# Patient Record
Sex: MALE | Race: WHITE | Employment: OTHER | URBAN - METROPOLITAN AREA
[De-identification: names, ages, dates, MRNs, and addresses within clinical notes are randomized per-mention and may not be internally consistent; named-entity substitution may affect disease eponyms.]

---

## 2017-01-05 PROBLEM — J01.00 ACUTE NON-RECURRENT MAXILLARY SINUSITIS: Status: ACTIVE | Noted: 2017-01-05

## 2017-01-19 PROCEDURE — 82306 VITAMIN D 25 HYDROXY: CPT | Performed by: SPECIALIST

## 2017-01-19 PROCEDURE — 80053 COMPREHEN METABOLIC PANEL: CPT | Performed by: SPECIALIST

## 2017-01-19 PROCEDURE — 36415 COLL VENOUS BLD VENIPUNCTURE: CPT | Performed by: SPECIALIST

## 2017-01-19 PROCEDURE — 84153 ASSAY OF PSA TOTAL: CPT | Performed by: SPECIALIST

## 2017-01-19 PROCEDURE — 85025 COMPLETE CBC W/AUTO DIFF WBC: CPT | Performed by: SPECIALIST

## 2017-01-19 PROCEDURE — 81003 URINALYSIS AUTO W/O SCOPE: CPT | Performed by: SPECIALIST

## 2017-01-19 PROCEDURE — 80061 LIPID PANEL: CPT | Performed by: SPECIALIST

## 2017-01-19 PROCEDURE — 84443 ASSAY THYROID STIM HORMONE: CPT | Performed by: SPECIALIST

## 2017-01-30 PROCEDURE — 36415 COLL VENOUS BLD VENIPUNCTURE: CPT | Performed by: SPECIALIST

## 2017-01-30 PROCEDURE — 84153 ASSAY OF PSA TOTAL: CPT | Performed by: SPECIALIST

## 2017-01-30 PROCEDURE — 84154 ASSAY OF PSA FREE: CPT | Performed by: SPECIALIST

## 2017-05-18 PROBLEM — E78.00 PURE HYPERCHOLESTEROLEMIA: Status: ACTIVE | Noted: 2017-05-18

## 2017-05-18 PROCEDURE — 84154 ASSAY OF PSA FREE: CPT | Performed by: SPECIALIST

## 2017-05-18 PROCEDURE — 36415 COLL VENOUS BLD VENIPUNCTURE: CPT | Performed by: SPECIALIST

## 2017-05-18 PROCEDURE — 84153 ASSAY OF PSA TOTAL: CPT | Performed by: SPECIALIST

## 2017-09-12 PROBLEM — M25.462 EFFUSION OF BURSA OF LEFT KNEE: Status: ACTIVE | Noted: 2017-09-12

## 2018-03-23 PROBLEM — R50.9 FEVER AND CHILLS: Status: ACTIVE | Noted: 2018-03-23

## 2018-03-23 PROBLEM — R05.9 COUGH: Status: ACTIVE | Noted: 2018-03-23

## 2018-09-13 PROBLEM — J01.01 ACUTE RECURRENT MAXILLARY SINUSITIS: Status: ACTIVE | Noted: 2017-01-05

## 2018-11-19 PROBLEM — R13.19 OTHER DYSPHAGIA: Status: ACTIVE | Noted: 2018-11-19

## 2023-11-27 ENCOUNTER — OFFICE VISIT (OUTPATIENT)
Dept: OTOLARYNGOLOGY | Facility: CLINIC | Age: 88
End: 2023-11-27

## 2023-11-27 DIAGNOSIS — J33.9 NASAL POLYPS: ICD-10-CM

## 2023-11-27 DIAGNOSIS — J32.0 LEFT MAXILLARY SINUSITIS: Primary | ICD-10-CM

## 2023-11-27 DIAGNOSIS — J32.2 CHRONIC ETHMOIDAL SINUSITIS: ICD-10-CM

## 2023-11-27 PROCEDURE — 99203 OFFICE O/P NEW LOW 30 MIN: CPT | Performed by: SPECIALIST

## 2023-11-27 RX ORDER — FLUTICASONE PROPIONATE 50 MCG
1 SPRAY, SUSPENSION (ML) NASAL DAILY
COMMUNITY

## 2023-11-27 RX ORDER — ATORVASTATIN CALCIUM 40 MG/1
40 TABLET, FILM COATED ORAL NIGHTLY
COMMUNITY
Start: 2023-02-27

## 2023-11-27 RX ORDER — SOTALOL HYDROCHLORIDE 80 MG/1
0.5 TABLET ORAL 2 TIMES DAILY
COMMUNITY
Start: 2023-07-31

## 2023-11-27 RX ORDER — VALSARTAN AND HYDROCHLOROTHIAZIDE 320; 25 MG/1; MG/1
1 TABLET, FILM COATED ORAL DAILY
COMMUNITY
Start: 2023-10-31

## 2023-11-27 NOTE — PATIENT INSTRUCTIONS
I sent a culture of  your sinusitis of your left maxillary and bilateral ethmoid sinuses. I will likely place you on an antibiotic based on culture results and 3 days of prednisone. Follow-up in 3 weeks time, sooner if problems.

## 2023-11-27 NOTE — PROGRESS NOTES
Marylee Boos is a 81year old male. Chief Complaint   Patient presents with    Sinus Problem     Patient reports sinus infection x 2 weeks   Reports sinus headaches. HPI:   Patient had been placed on 1 week of Augmentin had been hospitalized for sinusitis and headaches he has persistent symptoms. Current Outpatient Medications   Medication Sig Dispense Refill    atorvastatin 40 MG Oral Tab Take 1 tablet (40 mg total) by mouth nightly. fluticasone propionate 50 MCG/ACT Nasal Suspension 1 spray by Each Nare route daily. sotalol 80 MG Oral Tab Take 0.5 tablets (40 mg total) by mouth 2 (two) times daily. Valsartan-hydroCHLOROthiazide 320-25 MG Oral Tab Take 1 tablet by mouth daily. lisinopril 20 MG Oral Tab Take 1 tablet (20 mg total) by mouth once daily. 90 tablet 3    Pravastatin Sodium 20 MG Oral Tab TAKE ONE TABLET BY MOUTH AT BEDTIME 90 tablet 1    Albuterol Sulfate  (90 Base) MCG/ACT Inhalation Aero Soln Inhale 2 puffs into the lungs. methylPREDNISolone 4 MG Oral Tablet Therapy Pack Use as directed for 6 days 21 tablet 0    azithromycin (ZITHROMAX Z-TY) 250 MG Oral Tab Take two tablets today, then one tablet daily for 4 more days 6 tablet 0    guaiFENesin-codeine (CHERATUSSIN AC) 100-10 MG/5ML Oral Solution Take 5 mL by mouth every 6 (six) hours as needed for cough. 250 mL 1    OCUVITE (OCUVITE) Oral Tab Take 1 tablet by mouth daily with breakfast.      Vitamin B-12 (VITAMIN B12) 100 MCG Oral Tab Take 0.5 tablets (50 mcg total) by mouth daily. Ascorbic Acid (VITAMIN C) 1000 MG Oral Tab Take 1 tablet (1,000 mg total) by mouth as needed. Multiple Vitamin (TAB-A-KALYN) Oral Tab Take 1 tablet by mouth daily. aspirin 81 MG Oral Tab Take 1 tablet (81 mg total) by mouth daily.         Past Medical History:   Diagnosis Date    Allergic rhinitis     B12 deficiency 5/16/2014    CVA (cerebral vascular accident) ([facility] [address] 5/16/2014 1999     Hypertension 11/20/2014 Osteoarthrosis, unspecified whether generalized or localized, unspecified site     Other and unspecified hyperlipidemia     Otitis externa of both ears 11/20/2014    Unspecified essential hypertension       Social History:  Social History     Socioeconomic History    Marital status:    Tobacco Use    Smoking status: Never    Smokeless tobacco: Never   Vaping Use    Vaping Use: Never used   Substance and Sexual Activity    Alcohol use: Yes     Alcohol/week: 0.0 standard drinks of alcohol    Drug use: No        REVIEW OF SYSTEMS:   GENERAL HEALTH: feels well otherwise  GENERAL : denies fever, chills, sweats, weight loss, weight gain  SKIN: denies any unusual skin lesions or rashes  RESPIRATORY: denies shortness of breath with exertion  NEURO: denies headaches    EXAM:   There were no vitals taken for this visit. System Details   Skin Inspection - Normal.   Constitutional Overall appearance - Normal.   Head/Face Facial features - Normal. Eyebrows - Normal. Skull - Normal.   Eyes Conjunctiva - Right: Normal, Left: Normal. Pupil - Right: Normal, Left: Normal.    Ears Inspection - Right: Normal, Left: Normal.   Canal - Right: Normal, Left: Normal.   TM - Right: Normal, Left: Normal.   Nasal External nose - Normal.   Nasal septum -left nasal polyp and purulence  Turbinates - Normal.   Oral/Oropharynx Lips - Normal, Tonsils - Normal, Tongue - Normal    Neck Exam Inspection - Normal. Palpation - Normal. Parotid gland - Normal. Thyroid gland - Normal.   Lymph Detail Submental. Submandibular. Anterior cervical. Posterior cervical. Supraclavicular all without enlargement   Psychiatric Orientation - Oriented to time, place, person & situation. Appropriate mood and affect. Neurological Memory - Normal. Cranial nerves - Cranial nerves II through XII grossly intact. ASSESSMENT AND PLAN:   1. Left maxillary sinusitis  Reviewed MRI done 11/14/23. Air-fluid level in left maxillary sinus.   Mucosal thickening in ethmoid sinuses   and left sphenoid sinus. Culture = sent  - Aerobic Bacterial Culture; Future  - Aerobic Bacterial Culture    2. Nasal polyps  On the left    3. Chronic ethmoidal sinusitis  Await culture results      The patient indicates understanding of these issues and agrees to the plan.       Rosalva Lomeli MD  11/27/2023  11:52 AM

## 2023-11-29 RX ORDER — PREDNISONE 10 MG/1
10 TABLET ORAL DAILY
Qty: 3 TABLET | Refills: 0 | Status: SHIPPED | OUTPATIENT
Start: 2023-11-29

## 2023-11-29 RX ORDER — SULFAMETHOXAZOLE AND TRIMETHOPRIM 800; 160 MG/1; MG/1
1 TABLET ORAL EVERY 12 HOURS
Qty: 28 TABLET | Refills: 0 | Status: SHIPPED | OUTPATIENT
Start: 2023-11-29

## 2023-12-15 ENCOUNTER — OFFICE VISIT (OUTPATIENT)
Dept: OTOLARYNGOLOGY | Facility: CLINIC | Age: 88
End: 2023-12-15

## 2023-12-15 DIAGNOSIS — J33.9 NASAL POLYPS: ICD-10-CM

## 2023-12-15 DIAGNOSIS — J32.0 LEFT MAXILLARY SINUSITIS: Primary | ICD-10-CM

## 2023-12-15 DIAGNOSIS — J32.2 CHRONIC ETHMOIDAL SINUSITIS: ICD-10-CM

## 2023-12-15 PROCEDURE — 99213 OFFICE O/P EST LOW 20 MIN: CPT | Performed by: SPECIALIST

## 2023-12-15 RX ORDER — PREDNISONE 10 MG/1
10 TABLET ORAL DAILY
Qty: 3 TABLET | Refills: 0 | Status: SHIPPED | OUTPATIENT
Start: 2023-12-15

## 2023-12-15 RX ORDER — CEPHALEXIN 500 MG/1
500 CAPSULE ORAL EVERY 8 HOURS
Qty: 30 CAPSULE | Refills: 0 | Status: SHIPPED | OUTPATIENT
Start: 2023-12-15

## 2023-12-16 NOTE — PATIENT INSTRUCTIONS
You were placed on a trial of Keflex as your culture grew a Staph aureus sensitive to this. A refill of the prednisone was also ordered. Follow-up in 3 weeks time, sooner if problems.

## 2023-12-16 NOTE — PROGRESS NOTES
Omega Alexander is a 81year old male. Chief Complaint   Patient presents with    Follow - Up     Patient here for follow up left maxillary sinusitis     HPI:   And here for left ethmoid and maxillary sinusitis. He had a culture which showed Staph aureus sensitive to Bactrim which she finished. Feels improved but not resolved. Current Outpatient Medications   Medication Sig Dispense Refill    predniSONE 10 MG Oral Tab Take 1 tablet (10 mg total) by mouth daily. 3 tablet 0    cephalexin 500 MG Oral Cap Take 1 capsule (500 mg total) by mouth every 8 (eight) hours. 30 capsule 0    sulfamethoxazole-trimethoprim -160 MG Oral Tab per tablet Take 1 tablet by mouth every 12 (twelve) hours. 28 tablet 0    predniSONE 10 MG Oral Tab Take 1 tablet (10 mg total) by mouth daily. 3 tablet 0    atorvastatin 40 MG Oral Tab Take 1 tablet (40 mg total) by mouth nightly. fluticasone propionate 50 MCG/ACT Nasal Suspension 1 spray by Each Nare route daily. sotalol 80 MG Oral Tab Take 0.5 tablets (40 mg total) by mouth 2 (two) times daily. Valsartan-hydroCHLOROthiazide 320-25 MG Oral Tab Take 1 tablet by mouth daily. lisinopril 20 MG Oral Tab Take 1 tablet (20 mg total) by mouth once daily. 90 tablet 3    Pravastatin Sodium 20 MG Oral Tab TAKE ONE TABLET BY MOUTH AT BEDTIME 90 tablet 1    Albuterol Sulfate  (90 Base) MCG/ACT Inhalation Aero Soln Inhale 2 puffs into the lungs. methylPREDNISolone 4 MG Oral Tablet Therapy Pack Use as directed for 6 days 21 tablet 0    azithromycin (ZITHROMAX Z-TY) 250 MG Oral Tab Take two tablets today, then one tablet daily for 4 more days 6 tablet 0    guaiFENesin-codeine (CHERATUSSIN AC) 100-10 MG/5ML Oral Solution Take 5 mL by mouth every 6 (six) hours as needed for cough. 250 mL 1    OCUVITE (OCUVITE) Oral Tab Take 1 tablet by mouth daily with breakfast.      Vitamin B-12 (VITAMIN B12) 100 MCG Oral Tab Take 0.5 tablets (50 mcg total) by mouth daily.       Ascorbic Acid (VITAMIN C) 1000 MG Oral Tab Take 1 tablet (1,000 mg total) by mouth as needed. Multiple Vitamin (TAB-A-KALYN) Oral Tab Take 1 tablet by mouth daily. aspirin 81 MG Oral Tab Take 1 tablet (81 mg total) by mouth daily. Past Medical History:   Diagnosis Date    Allergic rhinitis     B12 deficiency 5/16/2014    CVA (cerebral vascular accident) ([facility] [address] 5/16/2014 1999     Hypertension 11/20/2014    Osteoarthrosis, unspecified whether generalized or localized, unspecified site     Other and unspecified hyperlipidemia     Otitis externa of both ears 11/20/2014    Unspecified essential hypertension       Social History:  Social History     Socioeconomic History    Marital status:    Tobacco Use    Smoking status: Never    Smokeless tobacco: Never   Vaping Use    Vaping Use: Never used   Substance and Sexual Activity    Alcohol use: Yes     Alcohol/week: 0.0 standard drinks of alcohol    Drug use: No        REVIEW OF SYSTEMS:   GENERAL HEALTH: feels well otherwise  GENERAL : denies fever, chills, sweats, weight loss, weight gain  SKIN: denies any unusual skin lesions or rashes  RESPIRATORY: denies shortness of breath with exertion  NEURO: denies headaches    EXAM:   There were no vitals taken for this visit.   System Details   Skin Inspection - Normal.   Constitutional Overall appearance - Normal.   Head/Face Facial features - Normal. Eyebrows - Normal. Skull - Normal.   Eyes Conjunctiva - Right: Normal, Left: Normal. Pupil - Right: Normal, Left: Normal.    Ears Inspection - Right: Normal, Left: Normal.   Canal - Right: Normal, Left: Normal.   TM - Right: Normal, Left: Normal.   Nasal External nose - Normal.   Nasal septum - Normal.  Turbinates -[place], no purulence or polyps seen by speculum examination   Oral/Oropharynx Lips - Normal, Tonsils - Normal, Tongue - Normal    Neck Exam Inspection - Normal. Palpation - Normal. Parotid gland - Normal. Thyroid gland - Normal.   Lymph Detail Submental. Submandibular. Anterior cervical. Posterior cervical. Supraclavicular all without enlargement   Psychiatric Orientation - Oriented to time, place, person & situation. Appropriate mood and affect. Neurological Memory - Normal. Cranial nerves - Cranial nerves II through XII grossly intact. ASSESSMENT AND PLAN:   1. Left maxillary sinusitis  a seen on MRI November 14, 2023. Air-fluid level. Culture showed 4+ Staph aureus. Also sensitive to Keflex. Patient placed on Keflex and prednisone for 3 days. Follow-up in 3 weeks time, sooner if problems. 2. Nasal polyps  On the MRI none seen by speculum examination    3. Chronic ethmoidal sinusitis  Seen on MRI. Follow-up in 3 weeks time, sooner if problems. Will consider fiberoptic scope if symptoms are persistent. The patient indicates understanding of these issues and agrees to the plan.       Dalton Moody MD  12/15/2023  9:28 PM

## 2024-01-05 ENCOUNTER — OFFICE VISIT (OUTPATIENT)
Dept: OTOLARYNGOLOGY | Facility: CLINIC | Age: 89
End: 2024-01-05

## 2024-01-05 DIAGNOSIS — J33.9 NASAL POLYPS: Primary | ICD-10-CM

## 2024-01-05 DIAGNOSIS — J32.2 CHRONIC ETHMOIDAL SINUSITIS: ICD-10-CM

## 2024-01-05 DIAGNOSIS — J32.0 LEFT MAXILLARY SINUSITIS: ICD-10-CM

## 2024-01-05 PROCEDURE — 99213 OFFICE O/P EST LOW 20 MIN: CPT | Performed by: SPECIALIST

## 2024-01-06 NOTE — PROGRESS NOTES
Eric Pitts is a 88 year old male.   Chief Complaint   Patient presents with    Follow - Up     Patient is here due to maxillary sinusitis follow up. Reports pain is 3/10.      HPI:   Patient here feels markedly better after the Keflex for staff aureus.  Is using saline irrigation with boiled water.  Is also using Flonase but not consistently.    Current Outpatient Medications   Medication Sig Dispense Refill    cephalexin 500 MG Oral Cap Take 1 capsule (500 mg total) by mouth every 8 (eight) hours. 30 capsule 0    sulfamethoxazole-trimethoprim -160 MG Oral Tab per tablet Take 1 tablet by mouth every 12 (twelve) hours. 28 tablet 0    atorvastatin 40 MG Oral Tab Take 1 tablet (40 mg total) by mouth nightly.      sotalol 80 MG Oral Tab Take 0.5 tablets (40 mg total) by mouth 2 (two) times daily.      Valsartan-hydroCHLOROthiazide 320-25 MG Oral Tab Take 1 tablet by mouth daily.      lisinopril 20 MG Oral Tab Take 1 tablet (20 mg total) by mouth once daily. 90 tablet 3    Pravastatin Sodium 20 MG Oral Tab TAKE ONE TABLET BY MOUTH AT BEDTIME 90 tablet 1    Albuterol Sulfate  (90 Base) MCG/ACT Inhalation Aero Soln Inhale 2 puffs into the lungs.      OCUVITE (OCUVITE) Oral Tab Take 1 tablet by mouth daily with breakfast.      Vitamin B-12 (VITAMIN B12) 100 MCG Oral Tab Take 0.5 tablets (50 mcg total) by mouth daily.      Ascorbic Acid (VITAMIN C) 1000 MG Oral Tab Take 1 tablet (1,000 mg total) by mouth as needed.      Multiple Vitamin (TAB-A-KALYN) Oral Tab Take 1 tablet by mouth daily.      aspirin 81 MG Oral Tab Take 1 tablet (81 mg total) by mouth daily.      predniSONE 10 MG Oral Tab Take 1 tablet (10 mg total) by mouth daily. (Patient not taking: Reported on 1/5/2024) 3 tablet 0    predniSONE 10 MG Oral Tab Take 1 tablet (10 mg total) by mouth daily. (Patient not taking: Reported on 1/5/2024) 3 tablet 0    fluticasone propionate 50 MCG/ACT Nasal Suspension 1 spray by Each Nare route daily. (Patient not  taking: Reported on 1/5/2024)      methylPREDNISolone 4 MG Oral Tablet Therapy Pack Use as directed for 6 days (Patient not taking: Reported on 1/5/2024) 21 tablet 0    azithromycin (ZITHROMAX Z-TY) 250 MG Oral Tab Take two tablets today, then one tablet daily for 4 more days (Patient not taking: Reported on 1/5/2024) 6 tablet 0    guaiFENesin-codeine (CHERATUSSIN AC) 100-10 MG/5ML Oral Solution Take 5 mL by mouth every 6 (six) hours as needed for cough. (Patient not taking: Reported on 1/5/2024) 250 mL 1      Past Medical History:   Diagnosis Date    Allergic rhinitis     B12 deficiency 5/16/2014    CVA (cerebral vascular accident) ([facility]) 5/16/2014 1999     Hypertension 11/20/2014    Osteoarthrosis, unspecified whether generalized or localized, unspecified site     Other and unspecified hyperlipidemia     Otitis externa of both ears 11/20/2014    Unspecified essential hypertension       Social History:  Social History     Socioeconomic History    Marital status:    Tobacco Use    Smoking status: Never    Smokeless tobacco: Never   Vaping Use    Vaping Use: Never used   Substance and Sexual Activity    Alcohol use: Yes     Alcohol/week: 0.0 standard drinks of alcohol    Drug use: No        REVIEW OF SYSTEMS:   GENERAL HEALTH: feels well otherwise  GENERAL : denies fever, chills, sweats, weight loss, weight gain  SKIN: denies any unusual skin lesions or rashes  RESPIRATORY: denies shortness of breath with exertion  NEURO: denies headaches    EXAM:   There were no vitals taken for this visit.  System Details   Skin Inspection - Normal.   Constitutional Overall appearance - Normal.   Head/Face Facial features - Normal. Eyebrows - Normal. Skull - Normal.   Eyes Conjunctiva - Right: Normal, Left: Normal. Pupil - Right: Normal, Left: Normal.    Ears Inspection - Right: Normal, Left: Normal.   Canal - Right: Normal, Left: Normal.   TM - Right: Normal, Left: Normal.   Nasal External nose - Normal.   Nasal septum -  Normal.  Turbinates -no further purulence but polyps seen in the bilateral middle meatal area   Oral/Oropharynx Lips - Normal, Tonsils - Normal, Tongue - Normal    Neck Exam Inspection - Normal. Palpation - Normal. Parotid gland - Normal. Thyroid gland - Normal.   Lymph Detail Submental. Submandibular. Anterior cervical. Posterior cervical. Supraclavicular all without enlargement   Psychiatric Orientation - Oriented to time, place, person & situation. Appropriate mood and affect.   Neurological Memory - Normal. Cranial nerves - Cranial nerves II through XII grossly intact.     ASSESSMENT AND PLAN:   1. Nasal polyps  Patient to use Flonase 2 sprays to each nostril daily.    2. Left maxillary sinusitis  No further purulence seen after the Keflex.  Patient to follow-up in 2 months time, sooner if problems.    3. Chronic ethmoidal sinusitis  No further purulence seen.  Patient to continue Flonase and follow-up in 2 months time, sooner if problems.      The patient indicates understanding of these issues and agrees to the plan.      Yarelis Calles MD  1/5/2024  8:24 PM

## 2024-01-06 NOTE — PATIENT INSTRUCTIONS
Continue Flonase for your nasal polyps.  No further purulence seen.  Follow-up in 2 months time, sooner if problems.

## 2024-03-01 ENCOUNTER — OFFICE VISIT (OUTPATIENT)
Dept: OTOLARYNGOLOGY | Facility: CLINIC | Age: 89
End: 2024-03-01

## 2024-03-01 DIAGNOSIS — J33.9 NASAL POLYPS: Primary | ICD-10-CM

## 2024-03-01 DIAGNOSIS — J34.89 SINUS PRESSURE: ICD-10-CM

## 2024-03-01 PROCEDURE — 99213 OFFICE O/P EST LOW 20 MIN: CPT | Performed by: SPECIALIST

## 2024-03-02 NOTE — PROGRESS NOTES
Eric Pitts is a 88 year old male.   Chief Complaint   Patient presents with    Follow - Up     nasal polyps     HPI:   Patient here for sinus pressure and nasal polyps.  He has been using Flonase nasal spray.    Current Outpatient Medications   Medication Sig Dispense Refill    cephalexin 500 MG Oral Cap Take 1 capsule (500 mg total) by mouth every 8 (eight) hours. 30 capsule 0    sulfamethoxazole-trimethoprim -160 MG Oral Tab per tablet Take 1 tablet by mouth every 12 (twelve) hours. 28 tablet 0    atorvastatin 40 MG Oral Tab Take 1 tablet (40 mg total) by mouth nightly.      sotalol 80 MG Oral Tab Take 0.5 tablets (40 mg total) by mouth 2 (two) times daily.      Valsartan-hydroCHLOROthiazide 320-25 MG Oral Tab Take 1 tablet by mouth daily.      lisinopril 20 MG Oral Tab Take 1 tablet (20 mg total) by mouth once daily. 90 tablet 3    Pravastatin Sodium 20 MG Oral Tab TAKE ONE TABLET BY MOUTH AT BEDTIME 90 tablet 1    Albuterol Sulfate  (90 Base) MCG/ACT Inhalation Aero Soln Inhale 2 puffs into the lungs.      OCUVITE (OCUVITE) Oral Tab Take 1 tablet by mouth daily with breakfast.      Vitamin B-12 (VITAMIN B12) 100 MCG Oral Tab Take 0.5 tablets (50 mcg total) by mouth daily.      Ascorbic Acid (VITAMIN C) 1000 MG Oral Tab Take 1 tablet (1,000 mg total) by mouth as needed.      Multiple Vitamin (TAB-A-KALYN) Oral Tab Take 1 tablet by mouth daily.      aspirin 81 MG Oral Tab Take 1 tablet (81 mg total) by mouth daily.      predniSONE 10 MG Oral Tab Take 1 tablet (10 mg total) by mouth daily. (Patient not taking: Reported on 1/5/2024) 3 tablet 0    predniSONE 10 MG Oral Tab Take 1 tablet (10 mg total) by mouth daily. (Patient not taking: Reported on 1/5/2024) 3 tablet 0    fluticasone propionate 50 MCG/ACT Nasal Suspension 1 spray by Each Nare route daily. (Patient not taking: Reported on 1/5/2024)      methylPREDNISolone 4 MG Oral Tablet Therapy Pack Use as directed for 6 days (Patient not taking:  Reported on 1/5/2024) 21 tablet 0    azithromycin (ZITHROMAX Z-TY) 250 MG Oral Tab Take two tablets today, then one tablet daily for 4 more days (Patient not taking: Reported on 1/5/2024) 6 tablet 0    guaiFENesin-codeine (CHERATUSSIN AC) 100-10 MG/5ML Oral Solution Take 5 mL by mouth every 6 (six) hours as needed for cough. (Patient not taking: Reported on 1/5/2024) 250 mL 1      Past Medical History:   Diagnosis Date    Allergic rhinitis     B12 deficiency 5/16/2014    CVA (cerebral vascular accident) ([facility] 5/16/2014 1999     Hypertension 11/20/2014    Osteoarthrosis, unspecified whether generalized or localized, unspecified site     Other and unspecified hyperlipidemia     Otitis externa of both ears 11/20/2014    Unspecified essential hypertension       Social History:  Social History     Socioeconomic History    Marital status:    Tobacco Use    Smoking status: Never    Smokeless tobacco: Never   Vaping Use    Vaping Use: Never used   Substance and Sexual Activity    Alcohol use: Yes     Alcohol/week: 0.0 standard drinks of alcohol    Drug use: No        REVIEW OF SYSTEMS:   GENERAL HEALTH: feels well otherwise  GENERAL : denies fever, chills, sweats, weight loss, weight gain  SKIN: denies any unusual skin lesions or rashes  RESPIRATORY: denies shortness of breath with exertion  NEURO: denies headaches    EXAM:   There were no vitals taken for this visit.  System Details   Skin Inspection - Normal.   Constitutional Overall appearance - Normal.   Head/Face Facial features - Normal. Eyebrows - Normal. Skull - Normal.   Eyes Conjunctiva - Right: Normal, Left: Normal. Pupil - Right: Normal, Left: Normal.    Ears Inspection - Right: Normal, Left: Normal.   Canal - Right: Normal, Left: Normal.   TM - Right: Normal, Left: Normal.   Nasal External nose - Normal.   Nasal septum - Normal.  Turbinates -congestion and bilateral middle meatal polyps right greater than left.  No purulence by speculum examination  on the date of the visit.   Oral/Oropharynx Lips - Normal, Tonsils - Normal, Tongue - Normal    Neck Exam Inspection - Normal. Palpation - Normal. Parotid gland - Normal. Thyroid gland - Normal.   Lymph Detail Submental. Submandibular. Anterior cervical. Posterior cervical. Supraclavicular all without enlargement   Psychiatric Orientation - Oriented to time, place, person & situation. Appropriate mood and affect.   Neurological Memory - Normal. Cranial nerves - Cranial nerves II through XII grossly intact.     ASSESSMENT AND PLAN:   1. Nasal polyps  Bilateral middle meatal nasal polyposis.  Can increase Flonase to 2 sprays twice daily.    2. Sinus pressure  Patient to get CT scan to better evaluate underlying sinus disease.  I will try to check this early next week.  To be done at [facility].      The patient indicates understanding of these issues and agrees to the plan.      Yarelis Calles MD  3/1/2024  9:07 PM

## 2024-03-02 NOTE — PATIENT INSTRUCTIONS
You had bilateral nasal polyps right greater than left in the middle meatus on the day to your visit.  Can increase your Flonase nasal spray to 2 sprays twice daily.  Please send me a copy of the CT scan after it has been done on Monday or Tuesday.

## 2024-03-08 ENCOUNTER — TELEPHONE (OUTPATIENT)
Dept: OTOLARYNGOLOGY | Facility: CLINIC | Age: 89
End: 2024-03-08